# Patient Record
Sex: FEMALE | Race: OTHER
[De-identification: names, ages, dates, MRNs, and addresses within clinical notes are randomized per-mention and may not be internally consistent; named-entity substitution may affect disease eponyms.]

---

## 2018-10-15 PROBLEM — Z00.00 ENCOUNTER FOR PREVENTIVE HEALTH EXAMINATION: Status: ACTIVE | Noted: 2018-10-15

## 2018-10-18 ENCOUNTER — APPOINTMENT (OUTPATIENT)
Dept: SURGERY | Facility: CLINIC | Age: 73
End: 2018-10-18
Payer: COMMERCIAL

## 2018-10-18 VITALS
WEIGHT: 146 LBS | TEMPERATURE: 98.2 F | BODY MASS INDEX: 29.04 KG/M2 | DIASTOLIC BLOOD PRESSURE: 49 MMHG | HEART RATE: 78 BPM | SYSTOLIC BLOOD PRESSURE: 152 MMHG | HEIGHT: 59.45 IN

## 2018-10-18 PROCEDURE — 99203 OFFICE O/P NEW LOW 30 MIN: CPT

## 2018-11-06 ENCOUNTER — OUTPATIENT (OUTPATIENT)
Dept: OUTPATIENT SERVICES | Facility: HOSPITAL | Age: 73
LOS: 1 days | End: 2018-11-06
Payer: COMMERCIAL

## 2018-11-06 VITALS
OXYGEN SATURATION: 100 % | DIASTOLIC BLOOD PRESSURE: 77 MMHG | RESPIRATION RATE: 18 BRPM | SYSTOLIC BLOOD PRESSURE: 151 MMHG | WEIGHT: 147.05 LBS | HEART RATE: 71 BPM | HEIGHT: 59 IN | TEMPERATURE: 99 F

## 2018-11-06 DIAGNOSIS — I10 ESSENTIAL (PRIMARY) HYPERTENSION: ICD-10-CM

## 2018-11-06 DIAGNOSIS — J45.909 UNSPECIFIED ASTHMA, UNCOMPLICATED: ICD-10-CM

## 2018-11-06 DIAGNOSIS — K40.90 UNILATERAL INGUINAL HERNIA, WITHOUT OBSTRUCTION OR GANGRENE, NOT SPECIFIED AS RECURRENT: ICD-10-CM

## 2018-11-06 DIAGNOSIS — Z98.41 CATARACT EXTRACTION STATUS, RIGHT EYE: Chronic | ICD-10-CM

## 2018-11-06 DIAGNOSIS — Z98.890 OTHER SPECIFIED POSTPROCEDURAL STATES: Chronic | ICD-10-CM

## 2018-11-06 DIAGNOSIS — Z01.818 ENCOUNTER FOR OTHER PREPROCEDURAL EXAMINATION: ICD-10-CM

## 2018-11-06 DIAGNOSIS — E78.5 HYPERLIPIDEMIA, UNSPECIFIED: ICD-10-CM

## 2018-11-06 PROCEDURE — G0463: CPT

## 2018-11-06 NOTE — H&P PST ADULT - PMH
Asthma    HLD (hyperlipidemia)    HTN (hypertension)    Inguinal hernia, left Asthma    Dryness of eye    HLD (hyperlipidemia)    HTN (hypertension)    Inguinal hernia, left    RA (rheumatoid arthritis)

## 2018-11-06 NOTE — H&P PST ADULT - GASTROINTESTINAL DETAILS
nontender/soft/no rebound tenderness/no distention/no masses palpable/bowel sounds normal/no bruit/normal

## 2018-11-06 NOTE — H&P PST ADULT - PSH
H/O left breast biopsy H/O left breast biopsy    History of bladder surgery  bladder lift  History of cataract surgery, right

## 2018-11-06 NOTE — H&P PST ADULT - RESPIRATORY AND THORAX COMMENTS
c/o URI- being treated presently with Azithromycin c/o URI- being treated presently with Azithromycin; asthma-last used inhaler years ago-last attack years ago-never intubated

## 2018-11-06 NOTE — H&P PST ADULT - ASSESSMENT
73 yr old female with PMH of asthma, HTN, HLD, Rheumatoid arthritis, dryness of eyes presents with left inguinal hernia. Pt for repair of left inguinal hernia on 11/16/2018. 73 yr old female with PMH of asthma, HTN, HLD, Rheumatoid arthritis, dryness of eyes, URI-being treated presently with Azithromycin presents with left inguinal hernia. Pt for repair of left inguinal hernia on 11/16/2018.

## 2018-11-06 NOTE — H&P PST ADULT - HISTORY OF PRESENT ILLNESS
73 yr old female with PMH of asthma, HTN, HLD, Rheumatoid arthritis, dryness of eyes presents with c/o left inguinal hernia for 2 months. Pt reports increased size and discomfort with ambulation. Pt for repair of left inguinal hernia on 11/16/2018.

## 2018-11-06 NOTE — H&P PST ADULT - NEGATIVE CARDIOVASCULAR SYMPTOMS
no orthopnea/no chest pain/no paroxysmal nocturnal dyspnea/no dyspnea on exertion/no peripheral edema/no palpitations/no claudication

## 2018-11-06 NOTE — H&P PST ADULT - NEGATIVE GASTROINTESTINAL SYMPTOMS
no vomiting/no change in bowel habits/no flatulence/no abdominal pain/no diarrhea/no constipation/no nausea/no melena

## 2018-11-06 NOTE — H&P PST ADULT - PROBLEM SELECTOR PLAN 2
Continue antihypertensive meds and take with sips of water on day of surgery.  Follow-up with PCP postop for management.

## 2018-11-15 RX ORDER — SODIUM CHLORIDE 9 MG/ML
3 INJECTION INTRAMUSCULAR; INTRAVENOUS; SUBCUTANEOUS EVERY 8 HOURS
Qty: 0 | Refills: 0 | Status: DISCONTINUED | OUTPATIENT
Start: 2018-12-05 | End: 2018-12-13

## 2018-12-04 ENCOUNTER — TRANSCRIPTION ENCOUNTER (OUTPATIENT)
Age: 73
End: 2018-12-04

## 2018-12-05 ENCOUNTER — OUTPATIENT (OUTPATIENT)
Dept: OUTPATIENT SERVICES | Facility: HOSPITAL | Age: 73
LOS: 1 days | End: 2018-12-05
Payer: COMMERCIAL

## 2018-12-05 VITALS
DIASTOLIC BLOOD PRESSURE: 64 MMHG | RESPIRATION RATE: 1 BRPM | SYSTOLIC BLOOD PRESSURE: 128 MMHG | HEART RATE: 68 BPM | TEMPERATURE: 99 F | OXYGEN SATURATION: 99 %

## 2018-12-05 VITALS
TEMPERATURE: 98 F | HEIGHT: 59 IN | OXYGEN SATURATION: 99 % | RESPIRATION RATE: 18 BRPM | HEART RATE: 61 BPM | SYSTOLIC BLOOD PRESSURE: 151 MMHG | DIASTOLIC BLOOD PRESSURE: 64 MMHG | WEIGHT: 147.05 LBS

## 2018-12-05 DIAGNOSIS — K40.90 UNILATERAL INGUINAL HERNIA, WITHOUT OBSTRUCTION OR GANGRENE, NOT SPECIFIED AS RECURRENT: ICD-10-CM

## 2018-12-05 DIAGNOSIS — Z01.818 ENCOUNTER FOR OTHER PREPROCEDURAL EXAMINATION: ICD-10-CM

## 2018-12-05 DIAGNOSIS — Z98.890 OTHER SPECIFIED POSTPROCEDURAL STATES: Chronic | ICD-10-CM

## 2018-12-05 DIAGNOSIS — Z98.41 CATARACT EXTRACTION STATUS, RIGHT EYE: Chronic | ICD-10-CM

## 2018-12-05 PROCEDURE — 49505 PRP I/HERN INIT REDUC >5 YR: CPT | Mod: LT

## 2018-12-05 PROCEDURE — 49505 PRP I/HERN INIT REDUC >5 YR: CPT | Mod: AS,LT

## 2018-12-05 RX ORDER — ACETAMINOPHEN 500 MG
1000 TABLET ORAL ONCE
Qty: 0 | Refills: 0 | Status: DISCONTINUED | OUTPATIENT
Start: 2018-12-05 | End: 2018-12-05

## 2018-12-05 RX ORDER — CLOPIDOGREL BISULFATE 75 MG/1
1 TABLET, FILM COATED ORAL
Qty: 0 | Refills: 0 | COMMUNITY

## 2018-12-05 RX ORDER — AMLODIPINE BESYLATE 2.5 MG/1
1 TABLET ORAL
Qty: 0 | Refills: 0 | COMMUNITY

## 2018-12-05 RX ORDER — ONDANSETRON 8 MG/1
4 TABLET, FILM COATED ORAL ONCE
Qty: 0 | Refills: 0 | Status: DISCONTINUED | OUTPATIENT
Start: 2018-12-05 | End: 2018-12-05

## 2018-12-05 RX ORDER — ACETAMINOPHEN WITH CODEINE 300MG-30MG
2 TABLET ORAL EVERY 4 HOURS
Qty: 0 | Refills: 0 | Status: DISCONTINUED | OUTPATIENT
Start: 2018-12-05 | End: 2018-12-05

## 2018-12-05 RX ORDER — OLMESARTAN MEDOXOMIL 5 MG/1
1 TABLET, FILM COATED ORAL
Qty: 0 | Refills: 0 | COMMUNITY

## 2018-12-05 RX ORDER — HYDROMORPHONE HYDROCHLORIDE 2 MG/ML
0.5 INJECTION INTRAMUSCULAR; INTRAVENOUS; SUBCUTANEOUS
Qty: 0 | Refills: 0 | Status: DISCONTINUED | OUTPATIENT
Start: 2018-12-05 | End: 2018-12-05

## 2018-12-05 RX ORDER — ACETAMINOPHEN WITH CODEINE 300MG-30MG
1 TABLET ORAL
Qty: 15 | Refills: 0 | OUTPATIENT
Start: 2018-12-05

## 2018-12-05 RX ORDER — ROSUVASTATIN CALCIUM 5 MG/1
1 TABLET ORAL
Qty: 0 | Refills: 0 | COMMUNITY

## 2018-12-05 RX ORDER — ACETAMINOPHEN 500 MG
2 TABLET ORAL
Qty: 0 | Refills: 0 | COMMUNITY

## 2018-12-05 NOTE — ASU DISCHARGE PLAN (ADULT/PEDIATRIC). - MEDICATION SUMMARY - MEDICATIONS TO TAKE
I will START or STAY ON the medications listed below when I get home from the hospital:    acetaminophen-codeine 300 mg-30 mg oral tablet  -- 1 tab(s) by mouth every 6 hours MDD:4 tabs prn pain  -- Caution federal law prohibits the transfer of this drug to any person other  than the person for whom it was prescribed.  May cause drowsiness.  Alcohol may intensify this effect.  Use care when operating dangerous machinery.  This product contains acetaminophen.  Do not use  with any other product containing acetaminophen to prevent possible liver damage.  Using more of this medication than prescribed may cause serious breathing problems.    -- Indication: For pain    naproxen 375 mg oral tablet  -- 1 tab(s) by mouth 2 times a day, As Needed  -- Indication: For as directed    Tylenol 500 mg oral tablet  -- 2 tab(s) by mouth every 6 hours, As Needed  -- Indication: For as directed    olmesartan 40 mg oral tablet  -- 1 tab(s) by mouth once a day  -- Indication: For as directed    rosuvastatin 10 mg oral tablet  -- 1 tab(s) by mouth once a day (at bedtime)  -- Indication: For as directed    clopidogrel 75 mg oral tablet  -- 1 tab(s) by mouth once a day  -- Indication: For as directed    amLODIPine 10 mg oral tablet  -- 1 tab(s) by mouth once a day  -- Indication: For as directed    hydroCHLOROthiazide 12.5 mg oral capsule  -- 1 cap(s) by mouth once a day  -- Indication: For as directed    Artificial Tears ophthalmic solution  -- 1 drop(s) to each affected eye 3 times a day  -- Indication: For as directed

## 2018-12-05 NOTE — ASU DISCHARGE PLAN (ADULT/PEDIATRIC). - BATHING
no change Keep dressing dry, clean, intact, for 2 days. After 2 days, remove dressing and leave steri strips on. You can shower with steri strips on.  If steri-strip falls off after shower its OK, if not you leave it there, it will fall off by itself in 2-3 days./shower only

## 2018-12-05 NOTE — ASU PATIENT PROFILE, ADULT - PMH
Asthma    Dryness of eye    HLD (hyperlipidemia)    HTN (hypertension)    Inguinal hernia, left    RA (rheumatoid arthritis)

## 2018-12-05 NOTE — BRIEF OPERATIVE NOTE - PROCEDURE
<<-----Click on this checkbox to enter Procedure Inguinal hernia repair, left  12/05/2018    Active  LUCAS

## 2018-12-05 NOTE — ASU PATIENT PROFILE, ADULT - PSH
H/O left breast biopsy    History of bladder surgery  bladder lift  History of cataract surgery, right

## 2018-12-10 PROBLEM — M06.9 RHEUMATOID ARTHRITIS, UNSPECIFIED: Chronic | Status: ACTIVE | Noted: 2018-11-06

## 2018-12-10 PROBLEM — E78.5 HYPERLIPIDEMIA, UNSPECIFIED: Chronic | Status: ACTIVE | Noted: 2018-11-06

## 2018-12-10 PROBLEM — K40.90 UNILATERAL INGUINAL HERNIA, WITHOUT OBSTRUCTION OR GANGRENE, NOT SPECIFIED AS RECURRENT: Chronic | Status: ACTIVE | Noted: 2018-11-06

## 2018-12-10 PROBLEM — H04.129 DRY EYE SYNDROME OF UNSPECIFIED LACRIMAL GLAND: Chronic | Status: ACTIVE | Noted: 2018-11-06

## 2018-12-10 PROBLEM — I10 ESSENTIAL (PRIMARY) HYPERTENSION: Chronic | Status: ACTIVE | Noted: 2018-11-06

## 2018-12-10 PROBLEM — J45.909 UNSPECIFIED ASTHMA, UNCOMPLICATED: Chronic | Status: ACTIVE | Noted: 2018-11-06

## 2018-12-13 PROBLEM — K40.90 HERNIA, INGUINAL: Status: ACTIVE | Noted: 2018-12-13

## 2018-12-13 PROBLEM — Z86.39 HISTORY OF HIGH CHOLESTEROL: Status: RESOLVED | Noted: 2018-10-18 | Resolved: 2018-12-13

## 2018-12-13 PROBLEM — Z87.09 HISTORY OF ASTHMA: Status: RESOLVED | Noted: 2018-12-13 | Resolved: 2018-12-13

## 2018-12-13 PROBLEM — Z87.39 HISTORY OF RHEUMATOID ARTHRITIS: Status: RESOLVED | Noted: 2018-12-13 | Resolved: 2018-12-13

## 2018-12-13 PROBLEM — Z86.79 HISTORY OF HYPERTENSION: Status: RESOLVED | Noted: 2018-10-18 | Resolved: 2018-12-13

## 2018-12-20 ENCOUNTER — APPOINTMENT (OUTPATIENT)
Dept: SURGERY | Facility: CLINIC | Age: 73
End: 2018-12-20
Payer: COMMERCIAL

## 2018-12-20 DIAGNOSIS — K40.90 UNILATERAL INGUINAL HERNIA, W/OUT OBSTRUCTION OR GANGRENE, NOT SPECIFIED AS RECURRENT: ICD-10-CM

## 2018-12-20 DIAGNOSIS — Z86.79 PERSONAL HISTORY OF OTHER DISEASES OF THE CIRCULATORY SYSTEM: ICD-10-CM

## 2018-12-20 DIAGNOSIS — Z87.39 PERSONAL HISTORY OF OTHER DISEASES OF THE MUSCULOSKELETAL SYSTEM AND CONNECTIVE TISSUE: ICD-10-CM

## 2018-12-20 DIAGNOSIS — Z86.39 PERSONAL HISTORY OF OTHER ENDOCRINE, NUTRITIONAL AND METABOLIC DISEASE: ICD-10-CM

## 2018-12-20 DIAGNOSIS — Z87.09 PERSONAL HISTORY OF OTHER DISEASES OF THE RESPIRATORY SYSTEM: ICD-10-CM

## 2018-12-20 PROCEDURE — 99024 POSTOP FOLLOW-UP VISIT: CPT

## 2019-04-21 NOTE — H&P PST ADULT - RS GEN PE MLT RESP DETAILS PC
Surgery/7TOW;701W no wheezes/no rales/breath sounds equal/airway patent/no chest wall tenderness/no intercostal retractions/good air movement/normal/no rhonchi/clear to auscultation bilaterally/respirations non-labored/no subcutaneous emphysema

## 2020-09-24 NOTE — ASU PATIENT PROFILE, ADULT - PRO INTERPRETER NEED 2
Patient returned call information below given. Verbally understood no further questions at this time.    Dominican

## 2023-02-09 ENCOUNTER — NON-APPOINTMENT (OUTPATIENT)
Age: 78
End: 2023-02-09

## 2023-02-10 ENCOUNTER — APPOINTMENT (OUTPATIENT)
Dept: UROLOGY | Facility: CLINIC | Age: 78
End: 2023-02-10
Payer: COMMERCIAL

## 2023-02-10 VITALS
BODY MASS INDEX: 28.86 KG/M2 | OXYGEN SATURATION: 97 % | HEIGHT: 60 IN | DIASTOLIC BLOOD PRESSURE: 73 MMHG | SYSTOLIC BLOOD PRESSURE: 175 MMHG | WEIGHT: 147 LBS | TEMPERATURE: 97.9 F | HEART RATE: 63 BPM

## 2023-02-10 DIAGNOSIS — N39.0 URINARY TRACT INFECTION, SITE NOT SPECIFIED: ICD-10-CM

## 2023-02-10 PROCEDURE — 99204 OFFICE O/P NEW MOD 45 MIN: CPT

## 2023-02-10 RX ORDER — SULFAMETHOXAZOLE AND TRIMETHOPRIM 800; 160 MG/1; MG/1
800-160 TABLET ORAL TWICE DAILY
Qty: 14 | Refills: 0 | Status: ACTIVE | COMMUNITY
Start: 2023-02-10 | End: 1900-01-01

## 2023-02-10 RX ORDER — PHENAZOPYRIDINE 200 MG/1
200 TABLET, FILM COATED ORAL 3 TIMES DAILY
Qty: 21 | Refills: 0 | Status: ACTIVE | COMMUNITY
Start: 2023-02-10 | End: 1900-01-01

## 2023-02-13 NOTE — REASON FOR VISIT
Reason for Disposition   [1] Caller is not with the adult (patient) AND [2] probable NON-URGENT symptoms    Protocols used: INFORMATION ONLY CALL-ADULT-    Patient's mother called to report a knee injury. Mother is not currently with adult patient injury is probably non-urgent. Writer requested that mother have patient call to report the injury through the employee injury line as patient is a [employer] employee. Mother agreeable to plan of care. [Initial Visit ___] : [unfilled] is here today for an initial visit  for [unfilled]

## 2023-02-14 LAB
APPEARANCE: CLEAR
BACTERIA UR CULT: NORMAL
BACTERIA: NEGATIVE
BILIRUBIN URINE: NEGATIVE
BLOOD URINE: NEGATIVE
COLOR: NORMAL
GLUCOSE QUALITATIVE U: NEGATIVE
HYALINE CASTS: 1 /LPF
KETONES URINE: NEGATIVE
LEUKOCYTE ESTERASE URINE: NEGATIVE
MICROSCOPIC-UA: NORMAL
NITRITE URINE: NEGATIVE
PH URINE: 6.5
PROTEIN URINE: NEGATIVE
RED BLOOD CELLS URINE: 1 /HPF
SPECIFIC GRAVITY URINE: 1.01
SQUAMOUS EPITHELIAL CELLS: 2 /HPF
UROBILINOGEN URINE: NORMAL
WHITE BLOOD CELLS URINE: 1 /HPF

## 2023-02-15 LAB — URINE CYTOLOGY: NORMAL

## 2023-02-17 ENCOUNTER — APPOINTMENT (OUTPATIENT)
Dept: UROLOGY | Facility: CLINIC | Age: 78
End: 2023-02-17
Payer: COMMERCIAL

## 2023-02-17 VITALS
HEART RATE: 76 BPM | WEIGHT: 147 LBS | OXYGEN SATURATION: 98 % | BODY MASS INDEX: 28.86 KG/M2 | DIASTOLIC BLOOD PRESSURE: 72 MMHG | SYSTOLIC BLOOD PRESSURE: 152 MMHG | HEIGHT: 60 IN

## 2023-02-17 PROCEDURE — 99213 OFFICE O/P EST LOW 20 MIN: CPT

## 2023-02-17 NOTE — PHYSICAL EXAM
[General Appearance - Well Developed] : well developed [General Appearance - Well Nourished] : well nourished [Normal Appearance] : normal appearance [Well Groomed] : well groomed [General Appearance - In No Acute Distress] : no acute distress [Abdomen Soft] : soft [Costovertebral Angle Tenderness] : no ~M costovertebral angle tenderness [Urinary Bladder Findings] : the bladder was normal on palpation [Edema] : no peripheral edema [] : no respiratory distress [Respiration, Rhythm And Depth] : normal respiratory rhythm and effort [Exaggerated Use Of Accessory Muscles For Inspiration] : no accessory muscle use [Oriented To Time, Place, And Person] : oriented to person, place, and time [Affect] : the affect was normal [Mood] : the mood was normal [Not Anxious] : not anxious [Normal Station and Gait] : the gait and station were normal for the patient's age [No Focal Deficits] : no focal deficits [No Palpable Adenopathy] : no palpable adenopathy [FreeTextEntry1] : RUQ tenderness to palpation

## 2023-02-17 NOTE — ASSESSMENT
[FreeTextEntry1] : Ms. CROW is a 77 year  Other race  F who comes today to clinic establish care.\par Referred by ER for persistent UTI since December. Has completed 1 course of antibiotics with no change in symptoms. Will start new course and obtain UC. 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 The Bosniak Classification system characterizes the complexity of cysts which allows us to estimate the chance that the lesion is malignant. Bosniak Classification is technically made with contrast enhanced CT scan, but can be inferred from ultrasound and MRI images.\par \par --Category 1: benign cysts with a thin wall, water density and no enhancement (0% risk of malignancy)\par --Category 2: benign cysts with a few hairline thin non-enhancing septations. There may be fine calcifications or a short thicker calcifications, <3cm hyperdense cysts that do not enhance (0% risk of malignancy)\par --Category 2F: Cysts with multiple hairline septae possible minimal enhancement or thickening of septae or wall; may have thicker or nodular calcifications; no enhancement, Endophytic hyperdense cysts >3cm (~5% risk of malignancy)\par --Category 3: Suspicious cysts with thickened or irregular walls with enhancement (~50% risk of malignancy)\par --Category 4: Suspicious cysts with enhancing soft tissue components (~100% risk of malignancy)\par \par The patient's cyst is a Bosniak 1. \par \par \par \par \par \par \par 
1

## 2023-02-17 NOTE — HISTORY OF PRESENT ILLNESS
[FreeTextEntry1] : Ms. CROW is a 77 year  Other race  F who comes today to clinic establish care.\par Referred by ER for persistent UTI since December. Has completed 1 course of antibiotics with no change in symptoms. Dysuria, frequency urgency. Last  UTI was more than 1 year ago. \par S/p bladder surgery for CECIL.\par \par Denies fevers, chills, hematuria, flank pain. He has never smoked. 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

## 2023-02-17 NOTE — ASSESSMENT
[Urinary Tract Infection (599.0\N39.0)] : qualitative ~C was positive [FreeTextEntry1] : Ms. CROW is a 77 year  Other race  F who comes today to clinic establish care.\par Referred by ER for persistent UTI since December. Has completed 1 course of antibiotics. 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 Advised to visit a GI or general surgeon, also to visit the ED in case of no improvement in pain or fever/chills. \par \par \par \par \par \par \par \par

## 2023-02-17 NOTE — HISTORY OF PRESENT ILLNESS
[FreeTextEntry1] : Ms. CROW is a 77 year  Other race  F who comes today to clinic establish care.\par Referred by ER for persistent UTI since December. Has completed 1 course of antibiotics with no change in symptoms. Dysuria, frequency urgency. Last  UTI was more than 1 year ago. \par S/p bladder surgery for CECIL.\par \par Denies fevers, chills, hematuria, flank pain. He has never smoked. 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

## 2023-03-24 ENCOUNTER — APPOINTMENT (OUTPATIENT)
Dept: GASTROENTEROLOGY | Facility: CLINIC | Age: 78
End: 2023-03-24
Payer: COMMERCIAL

## 2023-03-24 VITALS
HEIGHT: 61 IN | WEIGHT: 149 LBS | OXYGEN SATURATION: 97 % | DIASTOLIC BLOOD PRESSURE: 75 MMHG | BODY MASS INDEX: 28.13 KG/M2 | RESPIRATION RATE: 16 BRPM | SYSTOLIC BLOOD PRESSURE: 167 MMHG | HEART RATE: 75 BPM

## 2023-03-24 DIAGNOSIS — K76.89 OTHER SPECIFIED DISEASES OF LIVER: ICD-10-CM

## 2023-03-24 DIAGNOSIS — R10.31 RIGHT LOWER QUADRANT PAIN: ICD-10-CM

## 2023-03-24 DIAGNOSIS — R10.32 LEFT LOWER QUADRANT PAIN: ICD-10-CM

## 2023-03-24 DIAGNOSIS — K40.90 UNILATERAL INGUINAL HERNIA, W/OUT OBSTRUCTION OR GANGRENE, NOT SPECIFIED AS RECURRENT: ICD-10-CM

## 2023-03-24 PROCEDURE — 99203 OFFICE O/P NEW LOW 30 MIN: CPT

## 2023-03-26 NOTE — PHYSICAL EXAM
[Hearing Threshold Finger Rub Not Assumption] : hearing was normal [None] : no edema [Normal Color / Pigmentation] : normal skin color and pigmentation [Cranial Nerves Intact] : cranial nerves 2-12 were intact [Normal] : oriented to person, place, and time [Bowel Sounds] : normal bowel sounds [No Masses] : no abdominal mass palpated [Abdomen Soft] : soft [No Hernia] : no hernia [RLQ] : RLQ [LLQ] : LLQ [Ascites: ___] : no ascites [Rebound Tenderness] : no rebound tenderness [FreeTextEntry1] : patient had cataract surgery [de-identified] : Deferred

## 2023-03-26 NOTE — ASSESSMENT
[FreeTextEntry1] : 77 year old woman presents for follow-up, with RLQ, LLQ, and right flank pain (intermittent, mild, and triggered by movement for ~ 3 months/since beginning of December 2022).  Patient presented to urgent care 2/23; diagnosed with possible UTI. Patient had first UTI in 12/22.  Patient also has cyst in kidneys and an inguinal hernia, revealed on 1/18/23 abdominal CT following month-long UTI. The CT also revealed a hepatic cyst (mildly increased in size since prior exam; right hepatic lobe cyst 8.8 x 7.7 cm; compared to study on 9/20/18.Today, patient reports no nausea; reports good appetite. Provided patient with referral to a liver-specializing surgeon for liver cysts, and recommended patient see a surgeon for inguinal hernia.

## 2023-03-26 NOTE — CONSULT LETTER
[Dear  ___] : Dear ~AVERY, [Consult Letter:] : I had the pleasure of evaluating your patient, [unfilled]. [Please see my note below.] : Please see my note below. [Consult Closing:] : Thank you very much for allowing me to participate in the care of this patient.  If you have any questions, please do not hesitate to contact me. [FreeTextEntry2] : DONNIE Boyd

## 2023-03-26 NOTE — REASON FOR VISIT
[Initial Evaluation] : an initial evaluation [Family Member] : family member [Source: ______] : History obtained from [unfilled] [FreeTextEntry1] : Patient presents with abdominal pain (RLQ and LLQ) and right flank pain for ~3 months; also has liver cyst that has grown mildly since last CT, multiple renal cysts, and inguinal and umbilical hernias (mildly increased in since 9/20/18)

## 2023-03-26 NOTE — HISTORY OF PRESENT ILLNESS
[FreeTextEntry1] : ~2020 (Dr. Carlitos Brantley): possible polyp. will try to obtain colonoscopy and pathology reports [de-identified] : abdominal/pelvic CT (1/18/23, liver): right hepatic lobe cyst (mildly increased in size since prior exam- 8.8 x 7.7 cm; compared to study on 9/20/18), multiple renal cysts, small umbilical and right inguinal hernias (mildly increased in size since 9/20/18)\par \par  [de-identified] : 12/22, [facility] ([place]): Will try to obtain report

## 2023-04-13 ENCOUNTER — APPOINTMENT (OUTPATIENT)
Dept: SURGERY | Facility: CLINIC | Age: 78
End: 2023-04-13
Payer: COMMERCIAL

## 2023-04-13 PROCEDURE — 99203 OFFICE O/P NEW LOW 30 MIN: CPT

## 2023-05-12 ENCOUNTER — RESULT REVIEW (OUTPATIENT)
Age: 78
End: 2023-05-12

## 2023-05-12 ENCOUNTER — OUTPATIENT (OUTPATIENT)
Dept: OUTPATIENT SERVICES | Facility: HOSPITAL | Age: 78
LOS: 1 days | End: 2023-05-12
Payer: COMMERCIAL

## 2023-05-12 ENCOUNTER — APPOINTMENT (OUTPATIENT)
Dept: MRI IMAGING | Facility: IMAGING CENTER | Age: 78
End: 2023-05-12
Payer: COMMERCIAL

## 2023-05-12 DIAGNOSIS — Z98.890 OTHER SPECIFIED POSTPROCEDURAL STATES: Chronic | ICD-10-CM

## 2023-05-12 DIAGNOSIS — Z00.8 ENCOUNTER FOR OTHER GENERAL EXAMINATION: ICD-10-CM

## 2023-05-12 DIAGNOSIS — Z98.41 CATARACT EXTRACTION STATUS, RIGHT EYE: Chronic | ICD-10-CM

## 2023-05-12 PROCEDURE — 72197 MRI PELVIS W/O & W/DYE: CPT

## 2023-05-12 PROCEDURE — 72197 MRI PELVIS W/O & W/DYE: CPT | Mod: 26

## 2023-05-12 PROCEDURE — 74183 MRI ABD W/O CNTR FLWD CNTR: CPT | Mod: 26

## 2023-05-12 PROCEDURE — 74183 MRI ABD W/O CNTR FLWD CNTR: CPT

## 2023-05-12 PROCEDURE — A9585: CPT

## 2023-05-18 ENCOUNTER — APPOINTMENT (OUTPATIENT)
Dept: SURGERY | Facility: CLINIC | Age: 78
End: 2023-05-18

## 2023-05-31 ENCOUNTER — APPOINTMENT (OUTPATIENT)
Dept: UROLOGY | Facility: CLINIC | Age: 78
End: 2023-05-31

## 2023-07-28 ENCOUNTER — APPOINTMENT (OUTPATIENT)
Dept: SURGERY | Facility: CLINIC | Age: 78
End: 2023-07-28
Payer: COMMERCIAL

## 2023-07-28 VITALS
DIASTOLIC BLOOD PRESSURE: 69 MMHG | HEART RATE: 62 BPM | HEIGHT: 61 IN | SYSTOLIC BLOOD PRESSURE: 163 MMHG | WEIGHT: 149 LBS | OXYGEN SATURATION: 96 % | TEMPERATURE: 97.7 F | BODY MASS INDEX: 28.13 KG/M2

## 2023-07-28 PROCEDURE — 99213 OFFICE O/P EST LOW 20 MIN: CPT

## 2023-08-04 ENCOUNTER — APPOINTMENT (OUTPATIENT)
Dept: UROLOGY | Facility: CLINIC | Age: 78
End: 2023-08-04
Payer: COMMERCIAL

## 2023-08-04 VITALS
BODY MASS INDEX: 27 KG/M2 | OXYGEN SATURATION: 94 % | HEART RATE: 66 BPM | SYSTOLIC BLOOD PRESSURE: 166 MMHG | WEIGHT: 143 LBS | DIASTOLIC BLOOD PRESSURE: 77 MMHG | HEIGHT: 61 IN

## 2023-08-04 DIAGNOSIS — N39.0 URINARY TRACT INFECTION, SITE NOT SPECIFIED: ICD-10-CM

## 2023-08-04 PROCEDURE — 99213 OFFICE O/P EST LOW 20 MIN: CPT

## 2023-08-07 PROBLEM — N39.0 URINARY TRACT INFECTION: Status: ACTIVE | Noted: 2023-02-17

## 2023-08-07 LAB
APPEARANCE: CLEAR
BACTERIA UR CULT: NORMAL
BACTERIA: NEGATIVE /HPF
BILIRUBIN URINE: NEGATIVE
BLOOD URINE: NEGATIVE
CAST: 1 /LPF
COLOR: YELLOW
EPITHELIAL CELLS: 0 /HPF
GLUCOSE QUALITATIVE U: NEGATIVE MG/DL
KETONES URINE: NEGATIVE MG/DL
LEUKOCYTE ESTERASE URINE: ABNORMAL
MICROSCOPIC-UA: NORMAL
NITRITE URINE: NEGATIVE
PH URINE: 7.5
PROTEIN URINE: NEGATIVE MG/DL
RED BLOOD CELLS URINE: 0 /HPF
SPECIFIC GRAVITY URINE: 1.01
UROBILINOGEN URINE: 0.2 MG/DL
WHITE BLOOD CELLS URINE: 0 /HPF

## 2023-08-07 NOTE — PHYSICAL EXAM
[General Appearance - Well Developed] : well developed [General Appearance - Well Nourished] : well nourished [Normal Appearance] : normal appearance [Well Groomed] : well groomed [General Appearance - In No Acute Distress] : no acute distress [Abdomen Soft] : soft [Costovertebral Angle Tenderness] : no ~M costovertebral angle tenderness [FreeTextEntry1] : RUQ tenderness to palpation [Urinary Bladder Findings] : the bladder was normal on palpation [Edema] : no peripheral edema [] : no respiratory distress [Respiration, Rhythm And Depth] : normal respiratory rhythm and effort [Exaggerated Use Of Accessory Muscles For Inspiration] : no accessory muscle use [Oriented To Time, Place, And Person] : oriented to person, place, and time [Affect] : the affect was normal [Mood] : the mood was normal [Not Anxious] : not anxious [Normal Station and Gait] : the gait and station were normal for the patient's age [No Focal Deficits] : no focal deficits

## 2023-08-07 NOTE — HISTORY OF PRESENT ILLNESS
[FreeTextEntry1] : Ms. CROW is a 77 year  Other race  F who comes today to clinic establish care. Referred by ER for persistent UTI since December. Has completed 1 course of antibiotics with no change in symptoms. Dysuria, frequency urgency. Last  UTI was more than 1 year ago.  S/p bladder surgery for CECIL.  Denies fevers, chills, hematuria, flank pain. He has never smoked. No history of chemotherapy or radiation.  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  2/17/23 Urinary symptoms disappeared did not take last dose of bactrim due to GERD  also endorsing RUQ pain that started after lunch on Wed 8/4/23 Asymptomatic No new UTIs Denies fevers, chills, hematuria, flank pain.

## 2023-08-07 NOTE — ASSESSMENT
[FreeTextEntry1] : Ms. CROW is a 77 year  Other race  F who comes today to clinic establish care. Referred by ER for persistent UTI December-February. Resolved with 10day course of ATBs. Asymptomatic No new UTIs since then. 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 Discussed new UA/UC.         [Urinary Tract Infection (599.0\N39.0)] : qualitative ~C was positive

## 2024-08-02 ENCOUNTER — APPOINTMENT (OUTPATIENT)
Dept: UROLOGY | Facility: CLINIC | Age: 79
End: 2024-08-02

## 2024-08-13 ENCOUNTER — APPOINTMENT (OUTPATIENT)
Dept: ORTHOPEDIC SURGERY | Facility: CLINIC | Age: 79
End: 2024-08-13
Payer: MEDICARE

## 2024-08-13 VITALS — WEIGHT: 154 LBS | HEIGHT: 59.06 IN | BODY MASS INDEX: 31.04 KG/M2

## 2024-08-13 DIAGNOSIS — M17.12 UNILATERAL PRIMARY OSTEOARTHRITIS, LEFT KNEE: ICD-10-CM

## 2024-08-13 PROCEDURE — 73562 X-RAY EXAM OF KNEE 3: CPT | Mod: LT

## 2024-08-13 PROCEDURE — 20610 DRAIN/INJ JOINT/BURSA W/O US: CPT | Mod: LT

## 2024-08-13 PROCEDURE — 99204 OFFICE O/P NEW MOD 45 MIN: CPT | Mod: 25

## 2024-08-15 PROBLEM — M17.12 PRIMARY OSTEOARTHRITIS OF LEFT KNEE: Status: ACTIVE | Noted: 2024-08-13
